# Patient Record
Sex: FEMALE | Race: ASIAN | NOT HISPANIC OR LATINO
[De-identification: names, ages, dates, MRNs, and addresses within clinical notes are randomized per-mention and may not be internally consistent; named-entity substitution may affect disease eponyms.]

---

## 2020-09-28 ENCOUNTER — APPOINTMENT (OUTPATIENT)
Dept: RADIOLOGY | Facility: IMAGING CENTER | Age: 75
End: 2020-09-28
Payer: MEDICARE

## 2020-09-28 ENCOUNTER — OUTPATIENT (OUTPATIENT)
Dept: OUTPATIENT SERVICES | Facility: HOSPITAL | Age: 75
LOS: 1 days | End: 2020-09-28
Payer: MEDICARE

## 2020-09-28 DIAGNOSIS — Z00.8 ENCOUNTER FOR OTHER GENERAL EXAMINATION: ICD-10-CM

## 2020-09-28 PROCEDURE — 73110 X-RAY EXAM OF WRIST: CPT | Mod: 26,LT

## 2020-09-28 PROCEDURE — 73130 X-RAY EXAM OF HAND: CPT

## 2020-09-28 PROCEDURE — 73130 X-RAY EXAM OF HAND: CPT | Mod: 26,LT

## 2020-09-28 PROCEDURE — 73110 X-RAY EXAM OF WRIST: CPT

## 2023-03-20 ENCOUNTER — APPOINTMENT (OUTPATIENT)
Dept: ORTHOPEDIC SURGERY | Facility: CLINIC | Age: 78
End: 2023-03-20
Payer: MEDICARE

## 2023-03-20 VITALS — WEIGHT: 130 LBS | HEIGHT: 58 IN | BODY MASS INDEX: 27.29 KG/M2

## 2023-03-20 DIAGNOSIS — Z78.9 OTHER SPECIFIED HEALTH STATUS: ICD-10-CM

## 2023-03-20 DIAGNOSIS — S82.61XA DISPLACED FRACTURE OF LATERAL MALLEOLUS OF RIGHT FIBULA, INITIAL ENCOUNTER FOR CLOSED FRACTURE: ICD-10-CM

## 2023-03-20 PROCEDURE — L4361: CPT | Mod: RT,KX

## 2023-03-20 PROCEDURE — 27786 TREATMENT OF ANKLE FRACTURE: CPT

## 2023-03-20 PROCEDURE — 99204 OFFICE O/P NEW MOD 45 MIN: CPT | Mod: 25

## 2023-03-20 NOTE — DATA REVIEWED
[Outside X-rays] : outside x-rays [Right] : of the right [Ankle] : ankle [Foot] : foot [I reviewed the films/CD and additionally noted] : I reviewed the films/CD and additionally noted [FreeTextEntry1] : min displaced lat mall fx

## 2023-03-20 NOTE — ASSESSMENT
[FreeTextEntry1] : nwb in cam boot\par ice/elevate\par tylenol prn\par rx for wheelchair given\par f/up 3 wks w/ ankle xray

## 2023-03-20 NOTE — HISTORY OF PRESENT ILLNESS
[8] : 8 [6] : 6 [Sharp] : sharp [de-identified] : 03/20/2023: Fall 4 days ago w/ foot/ankle pain. went to urgent care and had xrays done. nwb in post op shoe and wheelchair. denies dm/tob. no prior foot/ankle probs.  [] : Post Surgical Visit: no [FreeTextEntry1] : RT foot/ankle

## 2023-03-20 NOTE — PHYSICAL EXAM
[Right] : right foot and ankle [Moderate] : moderate diffused ankle swelling [5___] : [facility] 5[unfilled]/5 [2+] : dorsalis pedis pulse: 2+ [] : uses wheelchair [de-identified] : plantar flexion 30 degrees [TWNoteComboBox7] : dorsiflexion 10 degrees

## 2023-03-30 ENCOUNTER — NON-APPOINTMENT (OUTPATIENT)
Age: 78
End: 2023-03-30

## 2023-04-10 ENCOUNTER — APPOINTMENT (OUTPATIENT)
Dept: ORTHOPEDIC SURGERY | Facility: CLINIC | Age: 78
End: 2023-04-10
Payer: MEDICARE

## 2023-04-10 VITALS — WEIGHT: 130 LBS | HEIGHT: 58 IN | BODY MASS INDEX: 27.29 KG/M2

## 2023-04-10 PROCEDURE — 73610 X-RAY EXAM OF ANKLE: CPT | Mod: RT

## 2023-04-10 PROCEDURE — 99024 POSTOP FOLLOW-UP VISIT: CPT

## 2023-04-10 NOTE — HISTORY OF PRESENT ILLNESS
[8] : 8 [6] : 6 [Sharp] : sharp [de-identified] : 03/20/2023: Fall 4 days ago w/ foot/ankle pain. went to urgent care and had xrays done. nwb in post op shoe and wheelchair. denies dm/tob. no prior foot/ankle probs. \par \par 04/10/2023: pain improving. mostly nwb in boot and wheelchair. [] : Post Surgical Visit: no [FreeTextEntry1] : RT foot/ankle

## 2023-04-10 NOTE — PHYSICAL EXAM
[Right] : right foot and ankle [Moderate] : moderate diffused ankle swelling [2+] : dorsalis pedis pulse: 2+ [] : no achilles tendon tenderness [5___] : plantar flexion 5[unfilled]/5 [FreeTextEntry8] : improved [de-identified] : plantar flexion 30 degrees [TWNoteComboBox7] : dorsiflexion 10 degrees

## 2023-05-01 ENCOUNTER — APPOINTMENT (OUTPATIENT)
Dept: ORTHOPEDIC SURGERY | Facility: CLINIC | Age: 78
End: 2023-05-01
Payer: MEDICARE

## 2023-05-01 PROCEDURE — 99024 POSTOP FOLLOW-UP VISIT: CPT

## 2023-05-01 PROCEDURE — 73610 X-RAY EXAM OF ANKLE: CPT | Mod: RT

## 2023-05-01 NOTE — PHYSICAL EXAM
[Right] : right foot and ankle [5___] : [facility] 5[unfilled]/5 [2+] : dorsalis pedis pulse: 2+ [Mild] : mild diffused ankle swelling [] : no achilles tendon tenderness [FreeTextEntry8] : improved [de-identified] : plantar flexion 30 degrees [TWNoteComboBox7] : dorsiflexion 10 degrees

## 2023-05-01 NOTE — ASSESSMENT
[FreeTextEntry1] : may begin to wb in boot\par ice/elevate\par tylenol prn\par f/up 2 wks w/ ankle xray

## 2023-05-01 NOTE — HISTORY OF PRESENT ILLNESS
[8] : 8 [6] : 6 [Sharp] : sharp [de-identified] : 03/20/2023: Fall 4 days ago w/ foot/ankle pain. went to urgent care and had xrays done. nwb in post op shoe and wheelchair. denies dm/tob. no prior foot/ankle probs. \par \par 04/10/2023: pain improving. mostly nwb in boot and wheelchair.\par \par 05/01/2023:  reports improvement. Mostly NWB in boot with wheelchair [] : Post Surgical Visit: no [FreeTextEntry1] : RT foot/ankle

## 2023-05-15 ENCOUNTER — APPOINTMENT (OUTPATIENT)
Dept: ORTHOPEDIC SURGERY | Facility: CLINIC | Age: 78
End: 2023-05-15
Payer: MEDICARE

## 2023-05-15 VITALS — HEIGHT: 58 IN | WEIGHT: 130 LBS | BODY MASS INDEX: 27.29 KG/M2

## 2023-05-15 DIAGNOSIS — S82.61XD DISPLACED FRACTURE OF LATERAL MALLEOLUS OF RIGHT FIBULA, SUBSEQUENT ENCOUNTER FOR CLOSED FRACTURE WITH ROUTINE HEALING: ICD-10-CM

## 2023-05-15 PROCEDURE — L4350: CPT | Mod: KX,RT

## 2023-05-15 PROCEDURE — 99024 POSTOP FOLLOW-UP VISIT: CPT

## 2023-05-15 PROCEDURE — 73610 X-RAY EXAM OF ANKLE: CPT | Mod: RT

## 2023-05-15 NOTE — ASSESSMENT
[FreeTextEntry1] : wbat in airsport brace\par icing and elevation\par start PT\par f/u 1 month\par \par Repeat x-ray will be performed at the next office visit right ankle\par

## 2023-05-15 NOTE — PHYSICAL EXAM
[Right] : right foot and ankle [Mild] : mild diffused ankle swelling [5___] : [facility] 5[unfilled]/5 [2+] : dorsalis pedis pulse: 2+ [] : no achilles tendon tenderness [de-identified] : plantar flexion 30 degrees [TWNoteComboBox7] : dorsiflexion 10 degrees

## 2023-05-15 NOTE — HISTORY OF PRESENT ILLNESS
[Sharp] : sharp [5] : 5 [3] : 3 [Dull/Aching] : dull/aching [de-identified] : 03/20/2023: Fall 4 days ago w/ foot/ankle pain. went to urgent care and had xrays done. nwb in post op shoe and wheelchair. denies dm/tob. no prior foot/ankle probs. \par \par 04/10/2023: pain improving. mostly nwb in boot and wheelchair.\par \par 05/01/2023:  reports improvement. Mostly NWB in boot with wheelchair\par \par 05/15/2023: pain improving. wb in boot and cane.  [] : Post Surgical Visit: no [FreeTextEntry1] : RT foot/ankle

## 2023-05-15 NOTE — IMAGING
[Right] : right ankle [The fracture is in acceptable alignment. There is progression in healing seen] : The fracture is in acceptable alignment. There is progression in healing seen [FreeTextEntry9] : There is a healed distal fibula fracture.

## 2025-05-02 ENCOUNTER — APPOINTMENT (OUTPATIENT)
Dept: ORTHOPEDIC SURGERY | Facility: CLINIC | Age: 80
End: 2025-05-02

## 2025-06-30 ENCOUNTER — APPOINTMENT (OUTPATIENT)
Dept: NUCLEAR MEDICINE | Facility: CLINIC | Age: 80
End: 2025-06-30

## 2025-06-30 ENCOUNTER — OUTPATIENT (OUTPATIENT)
Dept: OUTPATIENT SERVICES | Facility: HOSPITAL | Age: 80
LOS: 1 days | End: 2025-06-30
Payer: MEDICARE

## 2025-06-30 DIAGNOSIS — Z00.00 ENCOUNTER FOR GENERAL ADULT MEDICAL EXAMINATION WITHOUT ABNORMAL FINDINGS: ICD-10-CM

## 2025-06-30 PROCEDURE — 78803 RP LOCLZJ TUM SPECT 1 AREA: CPT | Mod: 26
